# Patient Record
Sex: MALE | Race: WHITE | Employment: OTHER | URBAN - METROPOLITAN AREA
[De-identification: names, ages, dates, MRNs, and addresses within clinical notes are randomized per-mention and may not be internally consistent; named-entity substitution may affect disease eponyms.]

---

## 2017-11-02 PROBLEM — N20.0 KIDNEY STONES: Status: ACTIVE | Noted: 2017-11-02

## 2019-12-30 ENCOUNTER — HOSPITAL ENCOUNTER (OUTPATIENT)
Dept: PHYSICAL THERAPY | Age: 69
Discharge: HOME | End: 2019-12-30
Payer: COMMERCIAL

## 2019-12-30 PROCEDURE — 97110 THERAPEUTIC EXERCISES: CPT

## 2019-12-30 PROCEDURE — 97162 PT EVAL MOD COMPLEX 30 MIN: CPT

## 2019-12-30 NOTE — PROGRESS NOTES
PT DAILY TREATMENT NOTE/CERVICAL [id number]    Patient Name: Soila Jones  Date:2019  : 1950  [x]  Patient  Verified  Payor: BLUE CROSS / Plan: [facility] PPO / Product Type: PPO /    In time: 59  Out time:   Total Treatment Time (min): 38  Visit #: 1 of 1    Medicare/BCBS Only   Total Timed Codes (min):  23 1:1 Treatment Time:  38     Treatment Area: Cervicalgia [M54.2]    SUBJECTIVE  Pain Level (0-10 scale): 3/10  []constant []intermittent []improving []worsening []no change since onset    Any medication changes, allergies to medications, adverse drug reactions, diagnosis change, or new procedure performed?: [x] No    [] Yes (see summary sheet for update)  Subjective functional status/changes:      Patient is a 81year old male who presents to the clinic with subjective c/o neck pain L > R. Patient states that the pain began about 5 months ago with insidious onset. Patient denies MRI; patient did take muscle relaxors which helped but stiffness and pain returned when use discontinued. The pain ranges from 4 to an 8. Patient is employed an . Patient requests evaluation only for the purpose of establishing HEP. Patient's primary c/o impaired neck ROM which impairs his ability to swim for fitness.       15 min [x]Eval                  []Re-Eval       23 min Therapeutic Exercise:  [] See flow sheet : issued and performed HEP   Rationale: increase ROM, increase strength and increase proprioception to improve the patients ability to restore neutral cervical posture          With   [x] TE   [] TA   [] neuro   [] other: Patient Education: [x] Review HEP    [] Progressed/Changed HEP based on:   [] positioning   [] body mechanics   [] transfers   [] heat/ice application    [] other:        Physical Therapy Evaluation Cervical Spine   Observation: moderate to severe FHRS                 AROM:                                Cervical Flexion: 36 Cervical Extension: 30                                Cervical Rotation: 45                                Cervical Sidebending: R 27, L 20                 MMT:                                      Shoulder Flexion: 5/5                                Shoulder Abduction: 5/5                                IR: 5/5                                ER: R 5/5, L 4/5                 FOTO: 63/100                 Palpation:                                 TTP B/L SCM, Cervical PS (+ for increased tension)  Pain Level (0-10 scale) post treatment: 3/10    ASSESSMENT/Changes in Function: Observation: moderate to severe FHRS                 AROM:                                Cervical Flexion: 36                                Cervical Extension: 30                                Cervical Rotation: 45                                Cervical Sidebending: R 27, L 20                 MMT:                                      Shoulder Flexion: 5/5                                Shoulder Abduction: 5/5                                IR: 5/5                                ER: R 5/5, L 4/5                 FOTO: 63/100                 Palpation:                                 TTP B/L SCM, Cervical PS (+ for increased tension)    Patient was issued and performed HEP per patient's request. Patient demonstrated understanding and proper technique. [x]  See Plan of Care  []  See progress note/recertification  []  See Discharge Summary         Progress towards goals / Updated goals:  1. Patient will demonstrate independence and correct performance of HEP.     PLAN  []  Upgrade activities as tolerated     []  Continue plan of care  []  Update interventions per flow sheet       [x]  Discharge due to:_Patient's request for eval and HEP only[]  Other:_      Maria Teresa Loza PT, DPT  12/30/2019  4:47 PM

## 2019-12-30 NOTE — PROGRESS NOTES
[address] [facility] AT [facility]  [address] [facility] [address] [address],[address], [place], [place]  Phone [phone]  Fax [phone] / [address] [address] PHYSICAL THERAPY SERVICES  Patient Name: Rocio Ramirez : 1950   Medical   Diagnosis: Cervicalgia [M54.2] Treatment Diagnosis: Neck Pain   Onset Date: 5 months ago (2019)     Referral Source: Referred, Self, MD Start of Care [facility] [place]): 2019   Prior Hospitalization: See medical history Provider #: [id number]   Prior Level of Function: Independent   Comorbidities: Gout, Hypertension, HBP   Medications: Verified on Patient Summary List   The Plan of Care and following information is based on the information from the initial evaluation.   =================================================================================  Assessment / key information: Patient is a 81year old male who presents to the clinic with subjective c/o neck pain L > R. Patient states that the pain began about 5 months ago with insidious onset. Patient denies MRI; patient did take muscle relaxors which helped but stiffness and pain returned when use discontinued. The pain ranges from 4 to an 8. Patient is employed an . Patient requests evaluation only for the purpose of establishing HEP. Patient's primary c/o impaired neck ROM which impairs his ability to swim for fitness. Clinical findings indicate impaired ROM, abnormal posture, increased tension in cervical musculature. Functional impairments include decreased ability to turn his head as required for driving and swimming, decreased ability to perform fitness regimen to include lifting weights, and impaired quality of life.   Clinical Findings:   Observation: moderate to severe FHRS   AROM:    Cervical Flexion: 36    Cervical Extension: 30    Cervical Rotation: 45    Cervical Sidebending: R 27, L 20   MMT:       Shoulder Flexion: 5/5    Shoulder Abduction: 5/5    IR: 5/5    ER: R 5/5, L 4/5   FOTO: 63/100   Palpation:     TTP B/L SCM, Cervical PS (+ for increased tension)   =================================================================================  Eval Complexity: History: HIGH Complexity :3+ comorbidities / personal factors will impact the outcome/ POC Exam:HIGH Complexity : 4+ Standardized tests and measures addressing body structure, function, activity limitation and / or participation in recreation  Presentation: MEDIUM Complexity : Evolving with changing characteristics  Clinical Decision Making:MEDIUM Complexity : FOTO score of 26-74Overall Complexity:MEDIUM  Problem List: pain affecting function, decrease ROM, decrease strength, decrease ADL/ functional abilitiies, decrease activity tolerance and decrease flexibility/ joint mobility   Treatment Plan may include any combination of the following: HEP for self-management of symptoms per patient's request.   Patient / Family readiness to learn indicated by: asking questions  Persons(s) to be included in education: patient (P)  Barriers to Learning/Limitations: None  Measures taken:    Patient Goal (s): \"get rid of the stiffness\"   Patient self reported health status: good  Rehabilitation Potential: good   Short Term Goals: To be accomplished in  1  treatments:  1. Patient will demonstrate independence and correct performance of HEP. Frequency / Duration:   Patient to be seen  1  times per week for 1  treatments: Discharge at this time with HEP per patient's request.  Patient / Caregiver education and instruction: exercises  G-Codes (GP): ROD  Therapist Signature: Yeni Valerio Date: 91/23/5310   Certification Period: NA Time: 1:46 PM   ===========================================================================================  I certify that the above Physical Therapy Services are being furnished while the patient is under my care.   I agree with the treatment plan and certify that this therapy is necessary. Physician Signature:        Date:       Time:     Please sign and return to [facility] or you may fax the signed copy to [phone]. Thank you.

## 2020-02-24 NOTE — PROGRESS NOTES
[address] [facility]ICAL [facility]APY AT [facility]  [address] [address], [place]  Phone [phone]  Fax [phone]  DISCHARGE SUMMARY FOR PHYSICAL THERAPY          Patient Name: Neptali Jeong : 1950   Treatment/Medical Diagnosis: Cervicalgia [M54.2]   Onset Date: 5 months ago    Referral Source: Referred, Self, MD Start of Care [facility]): 19   Prior Hospitalization: See Medical History Provider #: [id number]   Prior Level of Function: Independent   Comorbidities: Gout, Hypertension, HBP   Medications: Verified on Patient Summary List   Visits from [facility]: 1 Missed Visits: 1     Key Functional Changes/Progress:   Patient seen for evaluation only for establishment of HEP per patient's request. Please see evaluation for measements. G-Codes (GP): NA  Assessments/Recommendations: Other: discharge at this time with HEP per patient's request.  If you have any questions/comments please contact us directly at [phone]. Thank you for allowing us to assist in the care of your patient.     Therapist Signature: Tim Solorzano Date: 19   Reporting Period: NA Time: 12:04 PM